# Patient Record
Sex: FEMALE | URBAN - METROPOLITAN AREA
[De-identification: names, ages, dates, MRNs, and addresses within clinical notes are randomized per-mention and may not be internally consistent; named-entity substitution may affect disease eponyms.]

---

## 2019-11-12 ENCOUNTER — OFFICE VISIT (OUTPATIENT)
Dept: URBAN - METROPOLITAN AREA CLINIC 76 | Facility: CLINIC | Age: 49
End: 2019-11-12
Payer: COMMERCIAL

## 2019-11-12 PROCEDURE — 92004 COMPRE OPH EXAM NEW PT 1/>: CPT | Performed by: OPHTHALMOLOGY

## 2019-11-12 PROCEDURE — 92020 GONIOSCOPY: CPT | Performed by: OPHTHALMOLOGY

## 2019-11-12 RX ORDER — TIMOLOL MALEATE 5 MG/ML
0.5 % SOLUTION OPHTHALMIC
Qty: 5 | Refills: 6 | Status: INACTIVE
Start: 2019-11-12 | End: 2019-11-12

## 2019-11-12 ASSESSMENT — INTRAOCULAR PRESSURE
OS: 19
OD: 21

## 2019-11-12 NOTE — IMPRESSION/PLAN
Impression: Age-related nuclear cataract, bilateral: H25.13. OU. very mild Plan: Continue to monitor

## 2019-11-12 NOTE — IMPRESSION/PLAN
Impression: Primary open-angle glaucoma, bilateral, mild stage: H40.1131. OU. 
IOP OU ok today. need to check diurnal variation. Plan: Discussed diagnosis in detail with patient. Continue Timolol GFS QAM OU, Latanoprost QHS OU. Will request records and tests from Dr Washington Ramos. may need additional treatment.

## 2019-12-12 ENCOUNTER — OFFICE VISIT (OUTPATIENT)
Dept: URBAN - METROPOLITAN AREA CLINIC 76 | Facility: CLINIC | Age: 49
End: 2019-12-12
Payer: COMMERCIAL

## 2019-12-12 DIAGNOSIS — H35.371 PUCKERING OF MACULA, RIGHT EYE: ICD-10-CM

## 2019-12-12 PROCEDURE — 99214 OFFICE O/P EST MOD 30 MIN: CPT | Performed by: OPHTHALMOLOGY

## 2019-12-12 ASSESSMENT — KERATOMETRY
OD: 42.00
OS: 43.25

## 2019-12-12 ASSESSMENT — INTRAOCULAR PRESSURE
OS: 19
OD: 19

## 2020-01-08 ENCOUNTER — OFFICE VISIT (OUTPATIENT)
Dept: URBAN - METROPOLITAN AREA CLINIC 76 | Facility: CLINIC | Age: 50
End: 2020-01-08
Payer: COMMERCIAL

## 2020-01-08 DIAGNOSIS — H40.1131 PRIMARY OPEN-ANGLE GLAUCOMA, BILATERAL, MILD STAGE: Primary | ICD-10-CM

## 2020-01-08 PROCEDURE — 92012 INTRM OPH EXAM EST PATIENT: CPT | Performed by: OPHTHALMOLOGY

## 2020-01-08 RX ORDER — TIMOLOL MALEATE 5 MG/ML
0.5 % SOLUTION OPHTHALMIC
Qty: 3 | Refills: 3 | Status: INACTIVE
Start: 2020-01-08 | End: 2020-08-17

## 2020-01-08 RX ORDER — DORZOLAMIDE HCL 20 MG/ML
2 % SOLUTION/ DROPS OPHTHALMIC
Qty: 5 | Refills: 3 | Status: INACTIVE
Start: 2020-01-08 | End: 2020-06-22

## 2020-01-08 ASSESSMENT — INTRAOCULAR PRESSURE
OD: 17
OS: 18

## 2020-01-08 NOTE — IMPRESSION/PLAN
Impression: Primary open-angle glaucoma, bilateral, mild stage: H40.1131. OU. 
IOP OU unchanged w/ Combigan 
need to check diurnal variation. Plan: Continue to monitor. Continue Latanoprost QHS OU. Gave patient the option of D/C Combigan and switching back to Timolol. Discussed the need for additional treatment, starting additional drops vs SLT OU. Discussed r/b/a of SLT. Patient would like to try drops first, Will start Dorzolamide BID OU and will restart Timolol gel drops QAM OU. D/C Combigan  drops sheet wrote out for patient Discussed that it is common for Glaucoma drops to cause redness, dryness and irritation. Recommend using At's 3-4 times per day. Discussed there is a limit of Glaucoma meds available.

## 2020-02-11 ENCOUNTER — OFFICE VISIT (OUTPATIENT)
Dept: URBAN - METROPOLITAN AREA CLINIC 76 | Facility: CLINIC | Age: 50
End: 2020-02-11
Payer: COMMERCIAL

## 2020-02-11 PROCEDURE — 92012 INTRM OPH EXAM EST PATIENT: CPT | Performed by: OPHTHALMOLOGY

## 2020-02-11 ASSESSMENT — INTRAOCULAR PRESSURE
OS: 17
OD: 16

## 2020-02-11 NOTE — IMPRESSION/PLAN
Impression: Primary open-angle glaucoma, bilateral, mild stage: H40.1131. OU. 
IOP OU improved w/ Dorzolamide, still borderline. need to check diurnal variation. Plan: Continue to monitor. Continue Latanoprost QHS OU, Dorzolamide BID OU, Timolol gel drops QAM OU. Discussed additional treatment options. Recommend SLT OU. Discussed r/b/a of SLT. Pt would like to proceed.   Schedule SLT  OU

## 2020-03-10 ENCOUNTER — PROCEDURE (OUTPATIENT)
Dept: URBAN - METROPOLITAN AREA CLINIC 76 | Facility: CLINIC | Age: 50
End: 2020-03-10
Payer: COMMERCIAL

## 2020-03-10 RX ORDER — KETOROLAC TROMETHAMINE 5 MG/ML
0.5 % SOLUTION OPHTHALMIC
Qty: 5 | Refills: 0 | Status: INACTIVE
Start: 2020-03-10 | End: 2020-03-19

## 2020-03-10 ASSESSMENT — INTRAOCULAR PRESSURE
OD: 20
OS: 17
OS: 16
OD: 17

## 2020-03-19 ENCOUNTER — OFFICE VISIT (OUTPATIENT)
Dept: URBAN - METROPOLITAN AREA CLINIC 76 | Facility: CLINIC | Age: 50
End: 2020-03-19
Payer: COMMERCIAL

## 2020-03-19 PROCEDURE — 99024 POSTOP FOLLOW-UP VISIT: CPT | Performed by: OPHTHALMOLOGY

## 2020-03-19 ASSESSMENT — INTRAOCULAR PRESSURE
OS: 17
OD: 20

## 2020-03-19 NOTE — IMPRESSION/PLAN
Impression: Primary open-angle glaucoma, bilateral, mild stage: H40.1131. OU. S/p SLT OU (03/10/2020) IOP OU ok today, awaiting SLT effect 
need to check diurnal variation Plan: Continue to monitor. Continue Latanoprost QHS OU, Dorzolamide BID OU, Timolol gel drops QAM OU.

## 2020-04-16 ENCOUNTER — OFFICE VISIT (OUTPATIENT)
Dept: URBAN - METROPOLITAN AREA CLINIC 76 | Facility: CLINIC | Age: 50
End: 2020-04-16
Payer: COMMERCIAL

## 2020-04-16 DIAGNOSIS — H25.13 AGE-RELATED NUCLEAR CATARACT, BILATERAL: ICD-10-CM

## 2020-04-16 PROCEDURE — 92012 INTRM OPH EXAM EST PATIENT: CPT | Performed by: OPHTHALMOLOGY

## 2020-04-16 ASSESSMENT — INTRAOCULAR PRESSURE
OD: 20
OS: 18

## 2020-04-16 NOTE — IMPRESSION/PLAN
Impression: Primary open-angle glaucoma, bilateral, mild stage: H40.1131. OU. S/p SLT OU (03/10/2020) IOP OU unchanged w/ SLT. need to check diurnal variation Plan: Continue to monitor. Continue Dorzolamide BID OU, Timolol gel drops QAM OU. Discussed the need for additional treatment, recommend starting Vyzulta QHS OU. D/C Latanoprost 
 If no improvement will need to consider CE IOL w/ Istent to help w/ glaucoma control.

## 2020-05-07 ENCOUNTER — OFFICE VISIT (OUTPATIENT)
Dept: URBAN - METROPOLITAN AREA CLINIC 76 | Facility: CLINIC | Age: 50
End: 2020-05-07
Payer: COMMERCIAL

## 2020-05-07 PROCEDURE — 99213 OFFICE O/P EST LOW 20 MIN: CPT | Performed by: OPHTHALMOLOGY

## 2020-05-07 RX ORDER — LATANOPROSTENE BUNOD 0.24 MG/ML
0.024 % SOLUTION/ DROPS OPHTHALMIC
Qty: 5 | Refills: 6 | Status: INACTIVE
Start: 2020-05-07 | End: 2021-06-28

## 2020-05-07 ASSESSMENT — INTRAOCULAR PRESSURE
OS: 16
OD: 17

## 2020-05-07 NOTE — IMPRESSION/PLAN
Impression: Primary open-angle glaucoma, bilateral, mild stage: H40.1131. OU. S/p SLT OU (03/10/2020) IOP OU unchanged w/ SLT. need to check diurnal variation IOP improved OU today. Plan: Continue to monitor. Continue Dorzolamide BID OU, Timolol gel drops QAM OU,  Vyzulta QHS OU. If no improvement will need to consider CE IOL w/ Istent to help w/ glaucoma control.

## 2020-05-07 NOTE — IMPRESSION/PLAN
Impression: Age-related nuclear cataract, bilateral: H25.13. OU. very mild Plan: Continue to monitor.

## 2020-08-26 ENCOUNTER — OFFICE VISIT (OUTPATIENT)
Dept: URBAN - METROPOLITAN AREA CLINIC 76 | Facility: CLINIC | Age: 50
End: 2020-08-26
Payer: COMMERCIAL

## 2020-08-26 PROCEDURE — 92002 INTRM OPH EXAM NEW PATIENT: CPT | Performed by: OPTOMETRIST

## 2020-08-26 ASSESSMENT — INTRAOCULAR PRESSURE
OD: 16
OS: 16

## 2020-08-26 NOTE — IMPRESSION/PLAN
Impression: Primary open-angle glaucoma, bilateral, mild stage: H40.1131. OU. S/p SLT OU (03/10/2020)- ineffective OU. Target mid teens. IOP good OU today. Pt reports no Timolol x 1 mos due to pharmacy not refilling it. Plan: Continue to monitor. Continue Dorzolamide BID OU and Vyzulta QHS OU. Restart Timolol gel drops QAM OU. If no improvement will need to consider CE IOL w/ Istent to help w/ glaucoma control.

## 2021-05-19 ENCOUNTER — TESTING ONLY (OUTPATIENT)
Dept: URBAN - METROPOLITAN AREA CLINIC 76 | Facility: CLINIC | Age: 51
End: 2021-05-19
Payer: COMMERCIAL

## 2021-05-19 PROCEDURE — 92133 CPTRZD OPH DX IMG PST SGM ON: CPT | Performed by: OPHTHALMOLOGY

## 2021-05-19 PROCEDURE — 92083 EXTENDED VISUAL FIELD XM: CPT | Performed by: OPHTHALMOLOGY

## 2021-05-19 PROCEDURE — 76514 ECHO EXAM OF EYE THICKNESS: CPT | Performed by: OPHTHALMOLOGY

## 2021-05-20 ENCOUNTER — OFFICE VISIT (OUTPATIENT)
Dept: URBAN - METROPOLITAN AREA CLINIC 76 | Facility: CLINIC | Age: 51
End: 2021-05-20
Payer: COMMERCIAL

## 2021-05-20 PROCEDURE — 92014 COMPRE OPH EXAM EST PT 1/>: CPT | Performed by: OPHTHALMOLOGY

## 2021-05-20 PROCEDURE — 92133 CPTRZD OPH DX IMG PST SGM ON: CPT | Performed by: OPHTHALMOLOGY

## 2021-05-20 PROCEDURE — 92083 EXTENDED VISUAL FIELD XM: CPT | Performed by: OPHTHALMOLOGY

## 2021-05-20 ASSESSMENT — INTRAOCULAR PRESSURE
OD: 16
OS: 17

## 2021-05-20 NOTE — IMPRESSION/PLAN
Impression: Primary open-angle glaucoma, bilateral, mild stage: H40.1131. S/p SLT OU (03/10/2020)- ineffective OU. Target mid teens. IOP borderline OU today but ok for now. VF/OCT reviewed, stable compared to previous. Plan: Discussed condition. Continue Dorzolamide BID OU, Vyzulta QHS OU and Timolol GFS QAM OU. If no improvement will need to consider CE IOL w/ Istent to help w/ glaucoma control.

## 2021-05-20 NOTE — IMPRESSION/PLAN
Impression: Age-related nuclear cataract, bilateral: H25.13. OU. very mild Plan: Continue to monitor w/o tx.

## 2021-08-24 ENCOUNTER — OFFICE VISIT (OUTPATIENT)
Dept: URBAN - METROPOLITAN AREA CLINIC 76 | Facility: CLINIC | Age: 51
End: 2021-08-24
Payer: COMMERCIAL

## 2021-08-24 PROCEDURE — 99214 OFFICE O/P EST MOD 30 MIN: CPT | Performed by: OPHTHALMOLOGY

## 2021-08-24 ASSESSMENT — INTRAOCULAR PRESSURE
OS: 15
OD: 16

## 2021-08-24 NOTE — IMPRESSION/PLAN
Impression: Primary open-angle glaucoma, bilateral, mild stage: H40.1131. S/p SLT OU (03/10/2020)- ineffective OU. Target mid teens. IOP borderline OU today but ok for now. VF/OCT reviewed. Plan: Discussed condition. Continue Dorzolamide BID OU, Vyzulta QHS OU and Timolol GFS QAM OU. Emphasized strict compliance and reviewed drop instillation techniques. May need to consider CE IOL w/ Istent to help w/ glaucoma control.

## 2021-11-30 ENCOUNTER — OFFICE VISIT (OUTPATIENT)
Dept: URBAN - METROPOLITAN AREA CLINIC 76 | Facility: CLINIC | Age: 51
End: 2021-11-30
Payer: COMMERCIAL

## 2021-11-30 PROCEDURE — 99214 OFFICE O/P EST MOD 30 MIN: CPT | Performed by: OPHTHALMOLOGY

## 2021-11-30 ASSESSMENT — INTRAOCULAR PRESSURE
OS: 20
OD: 20

## 2021-11-30 NOTE — IMPRESSION/PLAN
Impression: Primary open-angle glaucoma, bilateral, mild stage: H40.1131. S/p SLT OU (03/10/2020)- ineffective OU. Target mid teens. IOP higher OU today. Pt has not used Vyzulta in a few months, unable to get from pharmacy. Plan: Discussed condition. Continue Dorzolamide BID OU, Vyzulta QHS OU (samples given) and Timolol GFS QAM OU. Emphasized strict compliance and reviewed drop instillation techniques. Will anticipate CE IOL w/ Istent to help w/ glaucoma control.

## 2021-11-30 NOTE — IMPRESSION/PLAN
Impression: Age-related nuclear cataract, bilateral: H25.13. Plan: Continue to monitor. May need to consider CE IOL to aide in glaucoma control.

## 2022-01-13 ENCOUNTER — OFFICE VISIT (OUTPATIENT)
Dept: URBAN - METROPOLITAN AREA CLINIC 76 | Facility: CLINIC | Age: 52
End: 2022-01-13
Payer: COMMERCIAL

## 2022-01-13 PROCEDURE — 99214 OFFICE O/P EST MOD 30 MIN: CPT | Performed by: OPHTHALMOLOGY

## 2022-01-13 ASSESSMENT — KERATOMETRY
OD: 42.63
OS: 42.63

## 2022-01-13 ASSESSMENT — INTRAOCULAR PRESSURE
OS: 17
OD: 17

## 2022-01-13 ASSESSMENT — VISUAL ACUITY
OS: 20/20
OD: 20/20

## 2022-01-13 NOTE — IMPRESSION/PLAN
Impression: Primary open-angle glaucoma, bilateral, mild stage: H40.1131. S/p SLT OU (03/10/2020)- ineffective OU. Target mid teens. IOP better today, pt more consistent w/ drops Plan: Discussed condition. Continue Dorzolamide BID OU, Vyzulta QHS OU and Timolol GFS QAM OU. Emphasized strict compliance.  Will hold off  CE IOL w/ Istent discussion for now

## 2022-04-14 ENCOUNTER — OFFICE VISIT (OUTPATIENT)
Dept: URBAN - METROPOLITAN AREA CLINIC 76 | Facility: CLINIC | Age: 52
End: 2022-04-14
Payer: COMMERCIAL

## 2022-04-14 DIAGNOSIS — H25.13 AGE-RELATED NUCLEAR CATARACT, BILATERAL: ICD-10-CM

## 2022-04-14 DIAGNOSIS — H40.1131 PRIMARY OPEN-ANGLE GLAUCOMA, BILATERAL, MILD STAGE: Primary | ICD-10-CM

## 2022-04-14 PROCEDURE — 99214 OFFICE O/P EST MOD 30 MIN: CPT | Performed by: OPHTHALMOLOGY

## 2022-04-14 NOTE — IMPRESSION/PLAN
Impression: Primary open-angle glaucoma, bilateral, mild stage: H40.1131. S/p SLT OU (03/10/2020)- ineffective OU. Target mid teens. IOP good OU today. Pt reports good compliance. Plan: Discussed condition. Continue Dorzolamide BID OU, Vyzulta QHS OU and Timolol GFS QAM OU. Emphasized strict compliance. Will hold off CE IOL w/ Istent discussion for now. Needs updated tests. Watch IOP. If tests show changes will recommend additional treatment.

## 2022-05-11 ENCOUNTER — TESTING ONLY (OUTPATIENT)
Dept: URBAN - METROPOLITAN AREA CLINIC 76 | Facility: CLINIC | Age: 52
End: 2022-05-11
Payer: COMMERCIAL

## 2022-05-11 DIAGNOSIS — H40.1131 PRIMARY OPEN-ANGLE GLAUCOMA, BILATERAL, MILD STAGE: Primary | ICD-10-CM

## 2022-05-11 PROCEDURE — 92133 CPTRZD OPH DX IMG PST SGM ON: CPT | Performed by: OPHTHALMOLOGY

## 2022-05-11 PROCEDURE — 92083 EXTENDED VISUAL FIELD XM: CPT | Performed by: OPHTHALMOLOGY

## 2022-05-12 ENCOUNTER — OFFICE VISIT (OUTPATIENT)
Dept: URBAN - METROPOLITAN AREA CLINIC 76 | Facility: CLINIC | Age: 52
End: 2022-05-12
Payer: COMMERCIAL

## 2022-05-12 DIAGNOSIS — H35.371 PUCKERING OF MACULA, RIGHT EYE: ICD-10-CM

## 2022-05-12 DIAGNOSIS — H25.13 AGE-RELATED NUCLEAR CATARACT, BILATERAL: ICD-10-CM

## 2022-05-12 PROCEDURE — 92014 COMPRE OPH EXAM EST PT 1/>: CPT | Performed by: OPHTHALMOLOGY

## 2022-05-12 ASSESSMENT — INTRAOCULAR PRESSURE
OS: 19
OD: 21

## 2022-05-12 NOTE — IMPRESSION/PLAN
Impression: Age-related nuclear cataract, bilateral: H25.13. Plan: Discussed condition. Continue to monitor without treatment at this time. May need to consider CE IOL to aide in glaucoma control.

## 2022-05-12 NOTE — IMPRESSION/PLAN
Impression: Primary open-angle glaucoma, bilateral, mild stage: H40.1131. S/p SLT OU (03/10/2020)- ineffective OU. Target mid teens. IOP higher OU today but ok for now. Pt reports good compliance. VF and OCT fluctuating but overall stable. Plan: Discussed condition in detail. Continue Dorzolamide BID OU, Vyzulta QHS OU and Timolol GFS QAM OU. Emphasized strict compliance. Watch IOP.

## 2022-08-18 ENCOUNTER — OFFICE VISIT (OUTPATIENT)
Dept: URBAN - METROPOLITAN AREA CLINIC 76 | Facility: CLINIC | Age: 52
End: 2022-08-18
Payer: COMMERCIAL

## 2022-08-18 DIAGNOSIS — H25.13 AGE-RELATED NUCLEAR CATARACT, BILATERAL: ICD-10-CM

## 2022-08-18 DIAGNOSIS — H40.1131 PRIMARY OPEN-ANGLE GLAUCOMA, BILATERAL, MILD STAGE: Primary | ICD-10-CM

## 2022-08-18 PROCEDURE — 99213 OFFICE O/P EST LOW 20 MIN: CPT | Performed by: OPHTHALMOLOGY

## 2022-08-18 ASSESSMENT — INTRAOCULAR PRESSURE
OS: 17
OD: 18

## 2022-08-18 NOTE — IMPRESSION/PLAN
Impression: Primary open-angle glaucoma, bilateral, mild stage: H40.1131. S/p SLT OU (03/10/2020)- ineffective OU. Target mid teens. IOP improved but borderline OU today, but ok for now. Plan: Discussed condition in detail. Continue Dorzolamide BID OU, Vyzulta QHS OU and Timolol GFS QAM OU. Emphasized strict compliance. Watch IOP.

## 2023-03-02 ENCOUNTER — OFFICE VISIT (OUTPATIENT)
Dept: URBAN - METROPOLITAN AREA CLINIC 76 | Facility: CLINIC | Age: 53
End: 2023-03-02
Payer: COMMERCIAL

## 2023-03-02 DIAGNOSIS — H25.13 AGE-RELATED NUCLEAR CATARACT, BILATERAL: ICD-10-CM

## 2023-03-02 DIAGNOSIS — H40.1131 PRIMARY OPEN-ANGLE GLAUCOMA, BILATERAL, MILD STAGE: Primary | ICD-10-CM

## 2023-03-02 PROCEDURE — 99213 OFFICE O/P EST LOW 20 MIN: CPT | Performed by: OPHTHALMOLOGY

## 2023-03-02 ASSESSMENT — INTRAOCULAR PRESSURE
OS: 17
OD: 21

## 2023-03-02 NOTE — IMPRESSION/PLAN
Impression: Primary open-angle glaucoma, bilateral, mild stage: H40.1131. S/p SLT OU (03/10/2020)- ineffective OU. Target mid teens. IOP higher OD today, stable OS. Pt reports poor compliance. Plan: Discussed condition in detail. Continue Dorzolamide BID OU and Timolol GFS QAM OU. Use Vyzulta QAM OU to help with compliance. Emphasized strict compliance. Watch IOP. Needs updated tests.

## 2023-04-20 ENCOUNTER — TESTING ONLY (OUTPATIENT)
Dept: URBAN - METROPOLITAN AREA CLINIC 76 | Facility: CLINIC | Age: 53
End: 2023-04-20
Payer: COMMERCIAL

## 2023-04-20 DIAGNOSIS — H40.1131 PRIMARY OPEN-ANGLE GLAUCOMA, BILATERAL, MILD STAGE: Primary | ICD-10-CM

## 2023-04-27 ENCOUNTER — OFFICE VISIT (OUTPATIENT)
Dept: URBAN - METROPOLITAN AREA CLINIC 76 | Facility: CLINIC | Age: 53
End: 2023-04-27
Payer: COMMERCIAL

## 2023-04-27 DIAGNOSIS — E11.9 TYPE 2 DIABETES MELLITUS W/O COMPLICATION: ICD-10-CM

## 2023-04-27 DIAGNOSIS — H40.1131 PRIMARY OPEN-ANGLE GLAUCOMA, BILATERAL, MILD STAGE: Primary | ICD-10-CM

## 2023-04-27 DIAGNOSIS — H25.13 AGE-RELATED NUCLEAR CATARACT, BILATERAL: ICD-10-CM

## 2023-04-27 PROCEDURE — 92014 COMPRE OPH EXAM EST PT 1/>: CPT | Performed by: OPHTHALMOLOGY

## 2023-04-27 ASSESSMENT — INTRAOCULAR PRESSURE
OD: 15
OS: 15

## 2023-04-27 NOTE — IMPRESSION/PLAN
Impression: Type 2 diabetes mellitus w/o complication: L65.4. Bilateral. Plan: No diabetic retinopathy, no signs of neovascularization noted. Discussed ocular and systemic benefits of blood sugar control.

## 2023-04-27 NOTE — IMPRESSION/PLAN
Impression: Primary open-angle glaucoma, bilateral, mild stage: H40.1131. S/p SLT OU (03/10/2020)- ineffective OU. Target mid teens. IOP good OU today. Pt reports poor compliance. VF/OCT shows fluctuation but ? reliability, overall stable. Plan: Discussed condition in detail. Continue Dorzolamide BID OU and Timolol GFS QAM OU. Use Vyzulta QAM OU to help with compliance. Emphasized strict compliance. Watch IOP.

## 2023-07-25 ENCOUNTER — OFFICE VISIT (OUTPATIENT)
Dept: URBAN - METROPOLITAN AREA CLINIC 76 | Facility: CLINIC | Age: 53
End: 2023-07-25
Payer: COMMERCIAL

## 2023-07-25 DIAGNOSIS — H25.13 AGE-RELATED NUCLEAR CATARACT, BILATERAL: ICD-10-CM

## 2023-07-25 DIAGNOSIS — H40.1131 PRIMARY OPEN-ANGLE GLAUCOMA, BILATERAL, MILD STAGE: Primary | ICD-10-CM

## 2023-07-25 PROCEDURE — 99213 OFFICE O/P EST LOW 20 MIN: CPT | Performed by: OPHTHALMOLOGY

## 2023-07-25 ASSESSMENT — INTRAOCULAR PRESSURE
OS: 15
OD: 15

## 2023-10-26 ENCOUNTER — OFFICE VISIT (OUTPATIENT)
Dept: URBAN - METROPOLITAN AREA CLINIC 76 | Facility: CLINIC | Age: 53
End: 2023-10-26
Payer: COMMERCIAL

## 2023-10-26 DIAGNOSIS — H25.13 AGE-RELATED NUCLEAR CATARACT, BILATERAL: ICD-10-CM

## 2023-10-26 DIAGNOSIS — H40.1131 PRIMARY OPEN-ANGLE GLAUCOMA, BILATERAL, MILD STAGE: Primary | ICD-10-CM

## 2023-10-26 PROCEDURE — 99213 OFFICE O/P EST LOW 20 MIN: CPT | Performed by: OPHTHALMOLOGY

## 2023-10-26 RX ORDER — LATANOPROSTENE BUNOD 0.24 MG/ML
0.024 % SOLUTION/ DROPS OPHTHALMIC
Qty: 7.5 | Refills: 3 | Status: ACTIVE
Start: 2023-10-26

## 2023-10-26 ASSESSMENT — INTRAOCULAR PRESSURE
OS: 16
OD: 16

## 2024-01-23 ENCOUNTER — OFFICE VISIT (OUTPATIENT)
Dept: URBAN - METROPOLITAN AREA CLINIC 76 | Facility: CLINIC | Age: 54
End: 2024-01-23
Payer: COMMERCIAL

## 2024-01-23 DIAGNOSIS — H25.13 AGE-RELATED NUCLEAR CATARACT, BILATERAL: ICD-10-CM

## 2024-01-23 DIAGNOSIS — H40.1131 PRIMARY OPEN-ANGLE GLAUCOMA, BILATERAL, MILD STAGE: Primary | ICD-10-CM

## 2024-01-23 PROCEDURE — 99213 OFFICE O/P EST LOW 20 MIN: CPT | Performed by: OPHTHALMOLOGY

## 2024-01-23 ASSESSMENT — INTRAOCULAR PRESSURE
OD: 18
OS: 19

## 2024-05-09 ENCOUNTER — OFFICE VISIT (OUTPATIENT)
Dept: URBAN - METROPOLITAN AREA CLINIC 76 | Facility: CLINIC | Age: 54
End: 2024-05-09
Payer: COMMERCIAL

## 2024-05-09 DIAGNOSIS — H40.1131 PRIMARY OPEN-ANGLE GLAUCOMA, BILATERAL, MILD STAGE: Primary | ICD-10-CM

## 2024-05-09 DIAGNOSIS — E11.9 TYPE 2 DIABETES MELLITUS W/O COMPLICATION: ICD-10-CM

## 2024-05-09 DIAGNOSIS — H25.13 AGE-RELATED NUCLEAR CATARACT, BILATERAL: ICD-10-CM

## 2024-05-09 PROCEDURE — 92133 CPTRZD OPH DX IMG PST SGM ON: CPT | Performed by: OPHTHALMOLOGY

## 2024-05-09 PROCEDURE — 92014 COMPRE OPH EXAM EST PT 1/>: CPT | Performed by: OPHTHALMOLOGY

## 2024-05-09 PROCEDURE — 92083 EXTENDED VISUAL FIELD XM: CPT | Performed by: OPHTHALMOLOGY

## 2024-05-09 RX ORDER — KETOROLAC TROMETHAMINE 5 MG/ML
0.5 % SOLUTION OPHTHALMIC
Qty: 5 | Refills: 0 | Status: ACTIVE
Start: 2024-05-09

## 2024-05-09 ASSESSMENT — INTRAOCULAR PRESSURE
OD: 20
OS: 19

## 2024-05-09 ASSESSMENT — KERATOMETRY
OD: 43.00
OS: 43.00

## 2024-05-28 ENCOUNTER — SURGERY (OUTPATIENT)
Dept: URBAN - METROPOLITAN AREA SURGERY 49 | Facility: SURGERY | Age: 54
End: 2024-05-28
Payer: COMMERCIAL

## 2024-06-05 ENCOUNTER — POST-OPERATIVE VISIT (OUTPATIENT)
Dept: URBAN - METROPOLITAN AREA CLINIC 76 | Facility: CLINIC | Age: 54
End: 2024-06-05
Payer: COMMERCIAL

## 2024-06-05 DIAGNOSIS — Z48.810 ENCOUNTER FOR SURGICAL AFTERCARE FOLLOWING SURGERY ON A SENSE ORGAN: Primary | ICD-10-CM

## 2024-06-05 PROCEDURE — 99024 POSTOP FOLLOW-UP VISIT: CPT | Performed by: OPTOMETRIST

## 2024-06-05 ASSESSMENT — INTRAOCULAR PRESSURE
OD: 19
OS: 19

## 2024-07-03 ENCOUNTER — OFFICE VISIT (OUTPATIENT)
Dept: URBAN - METROPOLITAN AREA CLINIC 76 | Facility: CLINIC | Age: 54
End: 2024-07-03
Payer: COMMERCIAL

## 2024-07-03 DIAGNOSIS — H40.1131 PRIMARY OPEN-ANGLE GLAUCOMA, BILATERAL, MILD STAGE: Primary | ICD-10-CM

## 2024-07-03 DIAGNOSIS — H25.13 AGE-RELATED NUCLEAR CATARACT, BILATERAL: ICD-10-CM

## 2024-07-03 PROCEDURE — 99213 OFFICE O/P EST LOW 20 MIN: CPT | Performed by: OPTOMETRIST

## 2024-07-03 ASSESSMENT — INTRAOCULAR PRESSURE
OD: 17
OS: 19